# Patient Record
Sex: FEMALE | Race: OTHER | URBAN - NONMETROPOLITAN AREA
[De-identification: names, ages, dates, MRNs, and addresses within clinical notes are randomized per-mention and may not be internally consistent; named-entity substitution may affect disease eponyms.]

---

## 2017-02-10 ENCOUNTER — APPOINTMENT (RX ONLY)
Dept: URBAN - NONMETROPOLITAN AREA CLINIC 3 | Facility: CLINIC | Age: 52
Setting detail: DERMATOLOGY
End: 2017-02-10

## 2017-02-10 DIAGNOSIS — L82.0 INFLAMED SEBORRHEIC KERATOSIS: ICD-10-CM

## 2017-02-10 DIAGNOSIS — L57.0 ACTINIC KERATOSIS: ICD-10-CM

## 2017-02-10 DIAGNOSIS — D22 MELANOCYTIC NEVI: ICD-10-CM

## 2017-02-10 DIAGNOSIS — L82.1 OTHER SEBORRHEIC KERATOSIS: ICD-10-CM

## 2017-02-10 DIAGNOSIS — D18.0 HEMANGIOMA: ICD-10-CM

## 2017-02-10 DIAGNOSIS — L81.4 OTHER MELANIN HYPERPIGMENTATION: ICD-10-CM

## 2017-02-10 PROBLEM — D18.01 HEMANGIOMA OF SKIN AND SUBCUTANEOUS TISSUE: Status: ACTIVE | Noted: 2017-02-10

## 2017-02-10 PROBLEM — D22.9 MELANOCYTIC NEVI, UNSPECIFIED: Status: ACTIVE | Noted: 2017-02-10

## 2017-02-10 PROBLEM — J45.909 UNSPECIFIED ASTHMA, UNCOMPLICATED: Status: ACTIVE | Noted: 2017-02-10

## 2017-02-10 PROBLEM — L57.8 OTHER SKIN CHANGES DUE TO CHRONIC EXPOSURE TO NONIONIZING RADIATION: Status: ACTIVE | Noted: 2017-02-10

## 2017-02-10 PROBLEM — E03.9 HYPOTHYROIDISM, UNSPECIFIED: Status: ACTIVE | Noted: 2017-02-10

## 2017-02-10 PROCEDURE — 99203 OFFICE O/P NEW LOW 30 MIN: CPT | Mod: 25

## 2017-02-10 PROCEDURE — ? COUNSELING

## 2017-02-10 PROCEDURE — 17000 DESTRUCT PREMALG LESION: CPT | Mod: 59

## 2017-02-10 PROCEDURE — 17110 DESTRUCTION B9 LES UP TO 14: CPT

## 2017-02-10 PROCEDURE — 17003 DESTRUCT PREMALG LES 2-14: CPT

## 2017-02-10 PROCEDURE — ? LIQUID NITROGEN

## 2017-02-10 ASSESSMENT — LOCATION DETAILED DESCRIPTION DERM
LOCATION DETAILED: LEFT INFERIOR CENTRAL MALAR CHEEK
LOCATION DETAILED: RIGHT INFERIOR CENTRAL MALAR CHEEK
LOCATION DETAILED: RIGHT VENTRAL PROXIMAL FOREARM
LOCATION DETAILED: LEFT VENTRAL PROXIMAL FOREARM

## 2017-02-10 ASSESSMENT — LOCATION SIMPLE DESCRIPTION DERM
LOCATION SIMPLE: RIGHT FOREARM
LOCATION SIMPLE: LEFT FOREARM
LOCATION SIMPLE: LEFT CHEEK
LOCATION SIMPLE: RIGHT CHEEK

## 2017-02-10 ASSESSMENT — TOTAL NUMBER OF LESIONS: # OF LESIONS?: 12

## 2017-02-10 ASSESSMENT — LOCATION ZONE DERM
LOCATION ZONE: ARM
LOCATION ZONE: FACE

## 2017-02-10 ASSESSMENT — PAIN INTENSITY VAS: HOW INTENSE IS YOUR PAIN 0 BEING NO PAIN, 10 BEING THE MOST SEVERE PAIN POSSIBLE?: NO PAIN

## 2017-02-10 NOTE — PROCEDURE: LIQUID NITROGEN
Post-Care Instructions: I reviewed with the patient in detail post-care instructions. Patient is to wear sunprotection, and avoid picking at any of the treated lesions. Pt may apply Vaseline to crusted or scabbing areas.
Total Number Of Aks Treated: 12
Render Post-Care Instructions In Note?: no
Duration Of Freeze Thaw-Cycle (Seconds): 0
Detail Level: Zone
Consent: The patient's consent was obtained including but not limited to risks of crusting, scabbing, blistering, scarring, darker or lighter pigmentary change, recurrence, incomplete removal and infection.
Medical Necessity Information: It is in your best interest to select a reason for this procedure from the list below. All of these items fulfill various CMS LCD requirements except the new and changing color options.
Medical Necessity Clause: This procedure was medically necessary because the lesions that were treated were:
Total Number Of Lesions Treated: 1
Detail Level: Detailed
Duration Of Freeze Thaw-Cycle (Seconds): 5

## 2017-02-10 NOTE — PROCEDURE: MIPS QUALITY
Quality 110: Preventive Care And Screening: Influenza Immunization: Influenza immunization was not ordered or administered, reason not given
Detail Level: Zone
Quality 431: Preventive Care And Screening: Unhealthy Alcohol Use - Screening: Patient screened for unhealthy alcohol use using a single question and scores less than 2 times per year
Quality 226: Preventive Care And Screening: Tobacco Use: Screening And Cessation Intervention: Patient screened for tobacco and never smoked
Quality 131: Pain Assessment And Follow-Up: Pain assessment using a standardized tool is documented as negative, no follow-up plan required
Quality 130: Documentation Of Current Medications In The Medical Record: Current Medications Documented

## 2017-05-05 ENCOUNTER — APPOINTMENT (RX ONLY)
Dept: URBAN - NONMETROPOLITAN AREA CLINIC 3 | Facility: CLINIC | Age: 52
Setting detail: DERMATOLOGY
End: 2017-05-05

## 2017-05-05 DIAGNOSIS — L81.4 OTHER MELANIN HYPERPIGMENTATION: ICD-10-CM

## 2017-05-05 DIAGNOSIS — L57.0 ACTINIC KERATOSIS: ICD-10-CM

## 2017-05-05 DIAGNOSIS — L82.1 OTHER SEBORRHEIC KERATOSIS: ICD-10-CM

## 2017-05-05 DIAGNOSIS — D22 MELANOCYTIC NEVI: ICD-10-CM

## 2017-05-05 PROBLEM — D48.5 NEOPLASM OF UNCERTAIN BEHAVIOR OF SKIN: Status: ACTIVE | Noted: 2017-05-05

## 2017-05-05 PROBLEM — D22.9 MELANOCYTIC NEVI, UNSPECIFIED: Status: ACTIVE | Noted: 2017-05-05

## 2017-05-05 PROCEDURE — 11300 SHAVE SKIN LESION 0.5 CM/<: CPT

## 2017-05-05 PROCEDURE — 11301 SHAVE SKIN LESION 0.6-1.0 CM: CPT

## 2017-05-05 PROCEDURE — 99214 OFFICE O/P EST MOD 30 MIN: CPT | Mod: 25

## 2017-05-05 PROCEDURE — ? PRESCRIPTION

## 2017-05-05 PROCEDURE — ? SHAVE REMOVAL

## 2017-05-05 PROCEDURE — ? COUNSELING

## 2017-05-05 RX ORDER — IMIQUIMOD 5 %
CREAM IN PACKET (EA) TOPICAL
Qty: 12 | Refills: 3 | Status: ERX | COMMUNITY
Start: 2017-05-05

## 2017-05-05 RX ADMIN — Medication: at 00:00

## 2017-05-05 ASSESSMENT — LOCATION DETAILED DESCRIPTION DERM
LOCATION DETAILED: LEFT ANTERIOR PROXIMAL THIGH
LOCATION DETAILED: LEFT LATERAL POSTERIOR ANKLE

## 2017-05-05 ASSESSMENT — TOTAL NUMBER OF LESIONS: # OF LESIONS?: 5

## 2017-05-05 ASSESSMENT — PAIN INTENSITY VAS: HOW INTENSE IS YOUR PAIN 0 BEING NO PAIN, 10 BEING THE MOST SEVERE PAIN POSSIBLE?: NO PAIN

## 2017-05-05 ASSESSMENT — LOCATION SIMPLE DESCRIPTION DERM
LOCATION SIMPLE: LEFT THIGH
LOCATION SIMPLE: LEFT ANKLE

## 2017-05-05 ASSESSMENT — LOCATION ZONE DERM: LOCATION ZONE: LEG

## 2017-05-05 NOTE — PROCEDURE: SHAVE REMOVAL
Lab: [id number]
Body Location Override (Optional - Billing Will Still Be Based On Selected Body Map Location If Applicable): Left hip
X Size Of Lesion In Cm (Optional): 0.6
Detail Level: Detailed
Anesthesia Volume In Cc: 0.3
Anesthesia Type: 1% lidocaine with epinephrine
Lab Facility: [id number]
Consent was obtained from the patient. The risks and benefits to therapy were discussed in detail. Specifically, the risks of infection, scarring, bleeding, prolonged wound healing, incomplete removal, allergy to anesthesia, nerve injury and recurrence were addressed. Prior to the procedure, the treatment site was clearly identified and confirmed by the patient. All components of Universal Protocol/PAUSE Rule completed.
Path Notes (To The Dermatopathologist): Size: 0.8
Billing Type: [employer]
Bill 33143 For Specimen Handling/Conveyance To Laboratory?: no
Size Of Lesion In Cm (Required): 0.8
Biopsy Method: Dermablade
Render Post-Care Instructions In Note?: yes
Hemostasis: Electrocautery
Wound Care: Bacitracin
Post-Care Instructions: I reviewed with the patient in detail post-care instructions. Patient is to keep the biopsy site dry overnight, and then apply bacitracin twice daily until healed.
Notification Instructions: Patient will be notified of biopsy results. However, patient instructed to call the office if not contacted within 2 weeks.
Medical Necessity Information: It is in your best interest to select a reason for this procedure from the list below. All of these items fulfill various CMS LCD requirements except the new and changing color options.
Medical Necessity Clause: This procedure was medically necessary because the lesion that was treated was:
Body Location Override (Optional - Billing Will Still Be Based On Selected Body Map Location If Applicable): Left lateral lower leg
Path Notes (To The Dermatopathologist): Size: 0.6
Lab: [id number]
Size Of Margin In Cm (Margins Are Not Added To Billing Dimensions): -
X Size Of Lesion In Cm (Optional): 0
Lab Facility: [address] [place] [address]
Billing Type: Third-Party Bill

## 2017-05-05 NOTE — PROCEDURE: MIPS QUALITY
Quality 110: Preventive Care And Screening: Influenza Immunization: Influenza Immunization not Administered because Patient Refused.
Quality 431: Preventive Care And Screening: Unhealthy Alcohol Use - Screening: Patient screened for unhealthy alcohol use using a single question and scores less than 2 times per year
Detail Level: Zone
Quality 226: Preventive Care And Screening: Tobacco Use: Screening And Cessation Intervention: Patient screened for tobacco and never smoked
Quality 131: Pain Assessment And Follow-Up: Pain assessment using a standardized tool is documented as negative, no follow-up plan required

## 2017-06-30 ENCOUNTER — APPOINTMENT (RX ONLY)
Dept: URBAN - NONMETROPOLITAN AREA CLINIC 3 | Facility: CLINIC | Age: 52
Setting detail: DERMATOLOGY
End: 2017-06-30

## 2017-06-30 DIAGNOSIS — L82.1 OTHER SEBORRHEIC KERATOSIS: ICD-10-CM

## 2017-06-30 DIAGNOSIS — D22 MELANOCYTIC NEVI: ICD-10-CM

## 2017-06-30 DIAGNOSIS — L23.9 ALLERGIC CONTACT DERMATITIS, UNSPECIFIED CAUSE: ICD-10-CM

## 2017-06-30 PROBLEM — D22.9 MELANOCYTIC NEVI, UNSPECIFIED: Status: ACTIVE | Noted: 2017-06-30

## 2017-06-30 PROBLEM — D48.5 NEOPLASM OF UNCERTAIN BEHAVIOR OF SKIN: Status: ACTIVE | Noted: 2017-06-30

## 2017-06-30 PROCEDURE — 11302 SHAVE SKIN LESION 1.1-2.0 CM: CPT

## 2017-06-30 PROCEDURE — ? SHAVE REMOVAL

## 2017-06-30 PROCEDURE — 88305 TISSUE EXAM BY PATHOLOGIST: CPT

## 2017-06-30 PROCEDURE — ? PATHOLOGY BILLING

## 2017-06-30 PROCEDURE — 99213 OFFICE O/P EST LOW 20 MIN: CPT | Mod: 25

## 2017-06-30 PROCEDURE — ? COUNSELING

## 2017-06-30 ASSESSMENT — PAIN INTENSITY VAS: HOW INTENSE IS YOUR PAIN 0 BEING NO PAIN, 10 BEING THE MOST SEVERE PAIN POSSIBLE?: NO PAIN

## 2017-06-30 ASSESSMENT — BSA RASH: BSA RASH: 5

## 2017-06-30 ASSESSMENT — LOCATION DETAILED DESCRIPTION DERM: LOCATION DETAILED: LEFT DISTAL CALF

## 2017-06-30 ASSESSMENT — LOCATION SIMPLE DESCRIPTION DERM: LOCATION SIMPLE: LEFT CALF

## 2017-06-30 ASSESSMENT — LOCATION ZONE DERM: LOCATION ZONE: LEG

## 2017-06-30 ASSESSMENT — SEVERITY ASSESSMENT: SEVERITY: MILD TO MODERATE

## 2017-06-30 NOTE — PROCEDURE: SHAVE REMOVAL
Medical Necessity Clause: This procedure was medically necessary because the lesion that was treated was:
Lab Facility: [address] [place] [address]
Anesthesia Volume In Cc: 0.3
Path Notes (To The Dermatopathologist): Size:1.1 recurrent Check Margins
X Size Of Lesion In Cm (Optional): 0
Medical Necessity Information: It is in your best interest to select a reason for this procedure from the list below. All of these items fulfill various CMS LCD requirements except the new and changing color options.
Render Post-Care Instructions In Note?: yes
Hemostasis: Electrocautery
Size Of Margin In Cm (Margins Are Not Added To Billing Dimensions): -
Body Location Override (Optional - Billing Will Still Be Based On Selected Body Map Location If Applicable): left lateral lower leg
Consent was obtained from the patient. The risks and benefits to therapy were discussed in detail. Specifically, the risks of infection, scarring, bleeding, prolonged wound healing, incomplete removal, allergy to anesthesia, nerve injury and recurrence were addressed. Prior to the procedure, the treatment site was clearly identified and confirmed by the patient. All components of Universal Protocol/PAUSE Rule completed.
Biopsy Method: Dermablade
Wound Care: Bacitracin
Post-Care Instructions: I reviewed with the patient in detail post-care instructions. Patient is to keep the biopsy site dry overnight, and then apply bacitracin twice daily until healed.
Notification Instructions: Patient will be notified of biopsy results. However, patient instructed to call the office if not contacted within 2 weeks.
Detail Level: Detailed
Bill For Surgical Tray: no
Billing Type: [employer]
Size Of Lesion In Cm (Required): 1.1
Lab: [id number]
Anesthesia Type: 1% lidocaine with epinephrine

## 2017-06-30 NOTE — PROCEDURE: MIPS QUALITY
Detail Level: Zone
Quality 110: Preventive Care And Screening: Influenza Immunization: Influenza Immunization Ordered or Recommended, but not Administered due to system reason
Quality 226: Preventive Care And Screening: Tobacco Use: Screening And Cessation Intervention: Patient screened for tobacco and never smoked
Quality 131: Pain Assessment And Follow-Up: Pain assessment using a standardized tool is documented as negative, no follow-up plan required
Quality 431: Preventive Care And Screening: Unhealthy Alcohol Use - Screening: Patient screened for unhealthy alcohol use using a single question and scores less than 2 times per year

## 2017-06-30 NOTE — PROCEDURE: PATHOLOGY BILLING
Rendering Text In Billing: The biopsy specimen was grossed and processed into a slide.
Cpt Code: 88386
Number Of Specimens Per Diagnosis: 1
Rendering Text In Billing: The slides were read, and reported in an attached document.
Rendering Text In Billing: The slide was read, and reported in an attached document.
Detail Level: Detailed
Rendering Text In Billing: The biopsy specimens were grossed and processed into slides.
Professional Component, Technical Component Or Both?: professional and technical component

## 2017-12-05 ENCOUNTER — APPOINTMENT (RX ONLY)
Dept: URBAN - NONMETROPOLITAN AREA CLINIC 3 | Facility: CLINIC | Age: 52
Setting detail: DERMATOLOGY
End: 2017-12-05

## 2017-12-05 DIAGNOSIS — D22 MELANOCYTIC NEVI: ICD-10-CM

## 2017-12-05 PROBLEM — D22.71 MELANOCYTIC NEVI OF RIGHT LOWER LIMB, INCLUDING HIP: Status: ACTIVE | Noted: 2017-12-05

## 2017-12-05 PROCEDURE — 99213 OFFICE O/P EST LOW 20 MIN: CPT

## 2017-12-05 PROCEDURE — ? OBSERVATION

## 2017-12-05 PROCEDURE — ? COUNSELING

## 2017-12-05 ASSESSMENT — LOCATION ZONE DERM: LOCATION ZONE: FEET

## 2017-12-05 ASSESSMENT — LOCATION DETAILED DESCRIPTION DERM: LOCATION DETAILED: RIGHT MEDIAL DORSAL FOOT

## 2017-12-05 ASSESSMENT — LOCATION SIMPLE DESCRIPTION DERM: LOCATION SIMPLE: RIGHT FOOT

## 2017-12-05 NOTE — PROCEDURE: OBSERVATION
Size Of Lesion In Cm (Optional): 0
Detail Level: Zone
Body Location Override (Optional - Billing Will Still Be Based On Selected Body Map Location If Applicable): right foot

## 2018-01-13 ENCOUNTER — HOSPITAL ENCOUNTER (EMERGENCY)
Facility: MEDICAL CENTER | Age: 53
End: 2018-01-13
Attending: EMERGENCY MEDICINE
Payer: COMMERCIAL

## 2018-01-13 ENCOUNTER — APPOINTMENT (OUTPATIENT)
Dept: RADIOLOGY | Facility: MEDICAL CENTER | Age: 53
End: 2018-01-13
Attending: EMERGENCY MEDICINE
Payer: COMMERCIAL

## 2018-01-13 VITALS
HEART RATE: 56 BPM | RESPIRATION RATE: 16 BRPM | TEMPERATURE: 99.4 F | HEIGHT: 71 IN | BODY MASS INDEX: 23.36 KG/M2 | WEIGHT: 166.89 LBS | DIASTOLIC BLOOD PRESSURE: 78 MMHG | OXYGEN SATURATION: 99 % | SYSTOLIC BLOOD PRESSURE: 149 MMHG

## 2018-01-13 DIAGNOSIS — R42 LIGHTHEADEDNESS: ICD-10-CM

## 2018-01-13 DIAGNOSIS — R07.89 CHEST PRESSURE: ICD-10-CM

## 2018-01-13 LAB
ALBUMIN SERPL BCP-MCNC: 4.4 G/DL (ref 3.2–4.9)
ALBUMIN/GLOB SERPL: 1.5 G/DL
ALP SERPL-CCNC: 45 U/L (ref 30–99)
ALT SERPL-CCNC: 10 U/L (ref 2–50)
ANION GAP SERPL CALC-SCNC: 10 MMOL/L (ref 0–11.9)
AST SERPL-CCNC: 17 U/L (ref 12–45)
BASOPHILS # BLD AUTO: 0.7 % (ref 0–1.8)
BASOPHILS # BLD: 0.05 K/UL (ref 0–0.12)
BILIRUB SERPL-MCNC: 0.8 MG/DL (ref 0.1–1.5)
BUN SERPL-MCNC: 14 MG/DL (ref 8–22)
CALCIUM SERPL-MCNC: 8.9 MG/DL (ref 8.5–10.5)
CHLORIDE SERPL-SCNC: 100 MMOL/L (ref 96–112)
CO2 SERPL-SCNC: 23 MMOL/L (ref 20–33)
CREAT SERPL-MCNC: 0.53 MG/DL (ref 0.5–1.4)
EKG IMPRESSION: NORMAL
EOSINOPHIL # BLD AUTO: 0.13 K/UL (ref 0–0.51)
EOSINOPHIL NFR BLD: 1.8 % (ref 0–6.9)
ERYTHROCYTE [DISTWIDTH] IN BLOOD BY AUTOMATED COUNT: 45.1 FL (ref 35.9–50)
GLOBULIN SER CALC-MCNC: 2.9 G/DL (ref 1.9–3.5)
GLUCOSE SERPL-MCNC: 86 MG/DL (ref 65–99)
HCT VFR BLD AUTO: 36.1 % (ref 37–47)
HGB BLD-MCNC: 12.4 G/DL (ref 12–16)
IMM GRANULOCYTES # BLD AUTO: 0.03 K/UL (ref 0–0.11)
IMM GRANULOCYTES NFR BLD AUTO: 0.4 % (ref 0–0.9)
LYMPHOCYTES # BLD AUTO: 2.15 K/UL (ref 1–4.8)
LYMPHOCYTES NFR BLD: 29.2 % (ref 22–41)
MCH RBC QN AUTO: 32.5 PG (ref 27–33)
MCHC RBC AUTO-ENTMCNC: 34.3 G/DL (ref 33.6–35)
MCV RBC AUTO: 94.5 FL (ref 81.4–97.8)
MONOCYTES # BLD AUTO: 0.44 K/UL (ref 0–0.85)
MONOCYTES NFR BLD AUTO: 6 % (ref 0–13.4)
NEUTROPHILS # BLD AUTO: 4.56 K/UL (ref 2–7.15)
NEUTROPHILS NFR BLD: 61.9 % (ref 44–72)
NRBC # BLD AUTO: 0 K/UL
NRBC BLD-RTO: 0 /100 WBC
PLATELET # BLD AUTO: 225 K/UL (ref 164–446)
PMV BLD AUTO: 9.6 FL (ref 9–12.9)
POTASSIUM SERPL-SCNC: 3.7 MMOL/L (ref 3.6–5.5)
PROT SERPL-MCNC: 7.3 G/DL (ref 6–8.2)
RBC # BLD AUTO: 3.82 M/UL (ref 4.2–5.4)
SODIUM SERPL-SCNC: 133 MMOL/L (ref 135–145)
TROPONIN I SERPL-MCNC: <0.01 NG/ML (ref 0–0.04)
WBC # BLD AUTO: 7.4 K/UL (ref 4.8–10.8)

## 2018-01-13 PROCEDURE — 85025 COMPLETE CBC W/AUTO DIFF WBC: CPT

## 2018-01-13 PROCEDURE — 80053 COMPREHEN METABOLIC PANEL: CPT

## 2018-01-13 PROCEDURE — 99284 EMERGENCY DEPT VISIT MOD MDM: CPT

## 2018-01-13 PROCEDURE — 93005 ELECTROCARDIOGRAM TRACING: CPT

## 2018-01-13 PROCEDURE — 93005 ELECTROCARDIOGRAM TRACING: CPT | Performed by: EMERGENCY MEDICINE

## 2018-01-13 PROCEDURE — 36415 COLL VENOUS BLD VENIPUNCTURE: CPT

## 2018-01-13 PROCEDURE — 84484 ASSAY OF TROPONIN QUANT: CPT

## 2018-01-13 ASSESSMENT — LIFESTYLE VARIABLES: DO YOU DRINK ALCOHOL: NO

## 2018-01-13 ASSESSMENT — PAIN SCALES - GENERAL
PAINLEVEL_OUTOF10: 0
PAINLEVEL_OUTOF10: 0

## 2018-01-13 NOTE — ED NOTES
"Pt ambulatory to triage with steady gait. This AM she had some dizziness which has mostly resolved.  Patient states she also has substernal and left sided chest pressure. EKG done and shown to ERP.    PT taken to senior lounge to await room assignment. Patient states she has not been feeling \"herself\" She started a ketogenic diet and has begun taking magnesium this week.  "

## 2018-01-14 NOTE — ED PROVIDER NOTES
"CHIEF COMPLAINT  Chief Complaint   Patient presents with   • Dizziness     this AM while driving   • Chest Pressure     substernal to left side, this AM       HPI (1,4)  Teresita Unger is a 52 y.o. female who presents lightheadedness since this morning (about 9:45 am). She says that she was driving from [employer] [place] when she started to feel like she might pass out while driving. She also says that around this same time she felt like she had some chest pressure on the left side of her chest. She rested along the side of the road but these symptoms did not resolve. She called 911 and was eventually driven up by friends to this ED. She says that this past week she has been \"feeling off\" but cant necessarily pinpoint why. She says that she is now on a ketogenic diet when she has been on since thanksgiving. Otherwise she has been exercising regularly and generally feels well. She denies F/C, n/v, headache, changes in vision, changes in speech, asymetric weakness, cough, nasal congestion, sore throat, SOB, abd pain, diarrhea, dysuria, or edema.    Currently the pt's sxs are resolved and she is resting comfortably in her bed. She does have a hx of asthma and hypothyroid. She takes Singulair and synthroid but is otherwise generally healthy.        REVIEW OF SYSTEMS(2/10)  Pertinent positives include: lightheaded, chest pressure  Pertinent negatives include: f/c, n/v, sob, cough, abd pain, diarrhea, dysuria, changes in vision, changes in speech, changes in strength/sensation   All other systems are negative.     PAST MEDICAL HISTORY(PFS1,2)  Past Medical History:   Diagnosis Date   • Asthma    • Hypothyroid        FAMILY HISTORY  History reviewed. No pertinent family history.    SOCIAL HISTORY  Social History   Substance Use Topics   • Smoking status: Never Smoker   • Smokeless tobacco: Never Used   • Alcohol use No     History   Drug Use No       SURGICAL HISTORY  History reviewed. No pertinent surgical " "history.    ALLERGIES  No Known Allergies    PHYSICAL EXAM (2,8)  VITAL SIGNS: /78   Pulse 65   Temp 37.4 °C (99.4 °F)   Resp 16   Ht 1.803 m (5' 11\")   Wt 75.7 kg (166 lb 14.2 oz)   SpO2 97%   BMI 23.28 kg/m²      Physical Exam   Constitutional: She is oriented to person, place, and time and well-developed, well-nourished, and in no distress. No distress.   HENT:   Head: Normocephalic and atraumatic.   Right Ear: External ear normal.   Left Ear: External ear normal.   Mouth/Throat: No oropharyngeal exudate.   Eyes: Conjunctivae and EOM are normal. Pupils are equal, round, and reactive to light. No scleral icterus.   Neck: Normal range of motion. Neck supple. No thyromegaly present.   Cardiovascular: Normal rate, regular rhythm, normal heart sounds and intact distal pulses.  Exam reveals no gallop and no friction rub.    No murmur heard.  Pulmonary/Chest: Effort normal and breath sounds normal. No respiratory distress. She has no wheezes. She has no rales. She exhibits no tenderness.   Abdominal: Soft. Bowel sounds are normal. She exhibits no distension. There is no tenderness. There is no rebound and no guarding.   Musculoskeletal: Normal range of motion. She exhibits no edema, tenderness or deformity.   Lymphadenopathy:     She has no cervical adenopathy.   Neurological: She is alert and oriented to person, place, and time. No cranial nerve deficit. She exhibits normal muscle tone. GCS score is 15.   Skin: Skin is warm and dry. No rash noted. She is not diaphoretic. No erythema.   Psychiatric: Mood, memory and affect normal.       DIFFERENTIAL DIAGNOSIS:  Arrhythmia  MI  CVA  PE    EKG  Normal rate/rhythm  DC interval normal  No ST abnormalities    RADIOLOGY/PROCEDURES  DX-CHEST-PORTABLE (1 VIEW)    (Results Pending)   Patient refused chest x-ray    LABORATORY: Reviewed as below.  Results for orders placed or performed during the hospital encounter of 01/13/18   Troponin STAT   Result Value Ref Range "    Troponin I <0.01 0.00 - 0.04 ng/mL   CBC w/ Differential   Result Value Ref Range    WBC 7.4 4.8 - 10.8 K/uL    RBC 3.82 (L) 4.20 - 5.40 M/uL    Hemoglobin 12.4 12.0 - 16.0 g/dL    Hematocrit 36.1 (L) 37.0 - 47.0 %    MCV 94.5 81.4 - 97.8 fL    MCH 32.5 27.0 - 33.0 pg    MCHC 34.3 33.6 - 35.0 g/dL    RDW 45.1 35.9 - 50.0 fL    Platelet Count 225 164 - 446 K/uL    MPV 9.6 9.0 - 12.9 fL    Neutrophils-Polys 61.90 44.00 - 72.00 %    Lymphocytes 29.20 22.00 - 41.00 %    Monocytes 6.00 0.00 - 13.40 %    Eosinophils 1.80 0.00 - 6.90 %    Basophils 0.70 0.00 - 1.80 %    Immature Granulocytes 0.40 0.00 - 0.90 %    Nucleated RBC 0.00 /100 WBC    Neutrophils (Absolute) 4.56 2.00 - 7.15 K/uL    Lymphs (Absolute) 2.15 1.00 - 4.80 K/uL    Monos (Absolute) 0.44 0.00 - 0.85 K/uL    Eos (Absolute) 0.13 0.00 - 0.51 K/uL    Baso (Absolute) 0.05 0.00 - 0.12 K/uL    Immature Granulocytes (abs) 0.03 0.00 - 0.11 K/uL    NRBC (Absolute) 0.00 K/uL   Complete Metabolic Panel (CMP)   Result Value Ref Range    Sodium 133 (L) 135 - 145 mmol/L    Potassium 3.7 3.6 - 5.5 mmol/L    Chloride 100 96 - 112 mmol/L    Co2 23 20 - 33 mmol/L    Anion Gap 10.0 0.0 - 11.9    Glucose 86 65 - 99 mg/dL    Bun 14 8 - 22 mg/dL    Creatinine 0.53 0.50 - 1.40 mg/dL    Calcium 8.9 8.5 - 10.5 mg/dL    AST(SGOT) 17 12 - 45 U/L    ALT(SGPT) 10 2 - 50 U/L    Alkaline Phosphatase 45 30 - 99 U/L    Total Bilirubin 0.8 0.1 - 1.5 mg/dL    Albumin 4.4 3.2 - 4.9 g/dL    Total Protein 7.3 6.0 - 8.2 g/dL    Globulin 2.9 1.9 - 3.5 g/dL    A-G Ratio 1.5 g/dL   ESTIMATED GFR   Result Value Ref Range    GFR If African American >60 >60 mL/min/1.73 m 2    GFR If Non African American >60 >60 mL/min/1.73 m 2   EKG (NOW)   Result Value Ref Range    Report       [facility]    Test Date:  2018-01-13  Pt Name:    OMEGA RATLIFF            Department: ER  MRN:        [id number]                      Room:  Gender:     Female                        Technician: [id number]  :        1965                   Requested By:ER TRIAGE PROTOCOL  Order #:    [id number]                    Reading MD:    Measurements  Intervals                                Axis  Rate:       66                           P:          75  ND:         136                          QRS:        69  QRSD:       86                           T:          71  QT:         420  QTc:        441    Interpretive Statements  SINUS RHYTHM  BORDERLINE T ABNORMALITIES, ANT-LAT LEADS  No previous ECG available for comparison         COURSE & MEDICAL DECISION MAKING   4pm: Pt presents with Lightheadedness and chest pressure starting around 9:45 am today. Her symptoms are currently resolved. Her vital signs are stable though she is hypertensive. Her EKG is normal rate and rhythm with no ST abnormalities. She is resting comfortably currently. A CXR was ordered Labs demonstrate a normal troponin, normal wbc and no major electrolyte abnormalities (mild hyponatremia). Given normal neuro exam I do not suspect CVA. Given normal vital signs, normal EKG and lack of risk factors for PE I doubt that as the cause. Mi does not appear likely given normal EKG and normal troponin. An Arrhythmia is certainly possible but the pt has had normal EKG and tele monitoring while here.    4:45 - pt declines CXR (risks/benefits explained) and would like to go home. She is feeling well. She will be discharged home with instructions to f/u with her pcp in 1-2 weeks and consider a stress test. She understands these instructions.     PLAN:  D/C home. F/U PCP in 1-2 weeks and consider stress test. Return to ED if further chest pain, palpitations, syncope, changes in mental status    CONDITION: Good    FINAL IMPRESSION  1. Chest pressure    2. Lightheadedness        Electronically signed by: Jake Geiger, 2018 4:17 PM        I independently evaluated the patient and repeated the important components of the history and physical.  I  discussed the management with the resident. I have reviewed and agree with the pertinent clinical information above including history, exam, study findings, and recommendations.     Encounter Summary: This is a 52 y.o. female with now resolved lightheadedness and chest pressure, it was present for several hours so I'm reassured by the negative troponin and unremarkable EKG, patient refused a chest x-ray. At this point I think the patient stable for discharge to follow-up for further evaluation and she understands that is because she is being discharge is not meeting her evaluation for this episode of chest pain is completed.      1. Chest pressure    2. Lightheadedness          Electronically signed by: Sander Shirley, 1/13/2018 9:19 PM

## 2018-01-14 NOTE — ED NOTES
Pt states understanding of dc instructions and f/u care. Pt able to amb out w/ steady gait. Going home w/ family.

## 2018-01-14 NOTE — DISCHARGE INSTRUCTIONS
Return immediately to the Emergency Department if you experience continuing or worsening discomfort in your chest, any difficulty breathing, back pain, abdominal pain, or any other new or worsening symptoms.      Nonspecific Chest Pain   Chest pain can be caused by many different conditions. There is always a chance that your pain could be related to something serious, such as a heart attack or a blood clot in your lungs. Chest pain can also be caused by conditions that are not life-threatening. If you have chest pain, it is very important to follow up with your health care provider.  CAUSES   Chest pain can be caused by:  · Heartburn.  · Pneumonia or bronchitis.  · Anxiety or stress.  · Inflammation around your heart (pericarditis) or lung (pleuritis or pleurisy).  · A blood clot in your lung.  · A collapsed lung (pneumothorax). It can develop suddenly on its own (spontaneous pneumothorax) or from trauma to the chest.  · Shingles infection (varicella-zoster virus).  · Heart attack.  · Damage to the bones, muscles, and cartilage that make up your chest wall. This can include:  ¨ Bruised bones due to injury.  ¨ Strained muscles or cartilage due to frequent or repeated coughing or overwork.  ¨ Fracture to one or more ribs.  ¨ Sore cartilage due to inflammation (costochondritis).  RISK FACTORS   Risk factors for chest pain may include:  · Activities that increase your risk for trauma or injury to your chest.  · Respiratory infections or conditions that cause frequent coughing.  · Medical conditions or overeating that can cause heartburn.  · Heart disease or family history of heart disease.  · Conditions or health behaviors that increase your risk of developing a blood clot.  · Having had chicken pox (varicella zoster).  SIGNS AND SYMPTOMS  Chest pain can feel like:  · Burning or tingling on the surface of your chest or deep in your chest.  · Crushing, pressure, aching, or squeezing pain.  · Dull or sharp pain that is  worse when you move, cough, or take a deep breath.  · Pain that is also felt in your back, neck, shoulder, or arm, or pain that spreads to any of these areas.  Your chest pain may come and go, or it may stay constant.  DIAGNOSIS  Lab tests or other studies may be needed to find the cause of your pain. Your health care provider may have you take a test called an ambulatory ECG (electrocardiogram). An ECG records your heartbeat patterns at the time the test is performed. You may also have other tests, such as:  · Transthoracic echocardiogram (TTE). During echocardiography, sound waves are used to create a picture of all of the heart structures and to look at how blood flows through your heart.  · Transesophageal echocardiogram (FARHAN). This is a more advanced imaging test that obtains images from inside your body. It allows your health care provider to see your heart in finer detail.  · Cardiac monitoring. This allows your health care provider to monitor your heart rate and rhythm in real time.  · Holter monitor. This is a portable device that records your heartbeat and can help to diagnose abnormal heartbeats. It allows your health care provider to track your heart activity for several days, if needed.  · Stress tests. These can be done through exercise or by taking medicine that makes your heart beat more quickly.  · Blood tests.  · Imaging tests.  TREATMENT   Your treatment depends on what is causing your chest pain. Treatment may include:  · Medicines. These may include:  ¨ Acid blockers for heartburn.  ¨ Anti-inflammatory medicine.  ¨ Pain medicine for inflammatory conditions.  ¨ Antibiotic medicine, if an infection is present.  ¨ Medicines to dissolve blood clots.  ¨ Medicines to treat coronary artery disease.  · Supportive care for conditions that do not require medicines. This may include:  ¨ Resting.  ¨ Applying heat or cold packs to injured areas.  ¨ Limiting activities until pain decreases.  HOME CARE  INSTRUCTIONS  · If you were prescribed an antibiotic medicine, finish it all even if you start to feel better.  · Avoid any activities that bring on chest pain.  · Do not use any tobacco products, including cigarettes, chewing tobacco, or electronic cigarettes. If you need help quitting, ask your health care provider.  · Do not drink alcohol.  · Take medicines only as directed by your health care provider.  · Keep all follow-up visits as directed by your health care provider. This is important. This includes any further testing if your chest pain does not go away.  · If heartburn is the cause for your chest pain, you may be told to keep your head raised (elevated) while sleeping. This reduces the chance that acid will go from your stomach into your esophagus.  · Make lifestyle changes as directed by your health care provider. These may include:  ¨ Getting regular exercise. Ask your health care provider to suggest some activities that are safe for you.  ¨ Eating a heart-healthy diet. A registered dietitian can help you to learn healthy eating options.  ¨ Maintaining a healthy weight.  ¨ Managing diabetes, if necessary.  ¨ Reducing stress.  SEEK MEDICAL CARE IF:  · Your chest pain does not go away after treatment.  · You have a rash with blisters on your chest.  · You have a fever.  SEEK IMMEDIATE MEDICAL CARE IF:   · Your chest pain is worse.  · You have an increasing cough, or you cough up blood.  · You have severe abdominal pain.  · You have severe weakness.  · You faint.  · You have chills.  · You have sudden, unexplained chest discomfort.  · You have sudden, unexplained discomfort in your arms, back, neck, or jaw.  · You have shortness of breath at any time.  · You suddenly start to sweat, or your skin gets clammy.  · You feel nauseous or you vomit.  · You suddenly feel light-headed or dizzy.  · Your heart begins to beat quickly, or it feels like it is skipping beats.  These symptoms may represent a serious  problem that is an emergency. Do not wait to see if the symptoms will go away. Get medical help right away. Call your local emergency services (911 in the [place] Do not drive yourself to the hospital.     This information is not intended to replace advice given to you by your health care provider. Make sure you discuss any questions you have with your health care provider.     Document Released: 09/27/2006 Document Revised: 01/08/2016 Document Reviewed: 07/24/2015  [employer] Interactive Patient Education ©2016 [employer]